# Patient Record
Sex: MALE | Race: WHITE | NOT HISPANIC OR LATINO | URBAN - METROPOLITAN AREA
[De-identification: names, ages, dates, MRNs, and addresses within clinical notes are randomized per-mention and may not be internally consistent; named-entity substitution may affect disease eponyms.]

---

## 2017-09-07 ENCOUNTER — EMERGENCY (EMERGENCY)
Facility: HOSPITAL | Age: 57
LOS: 1 days | Discharge: HOME | End: 2017-09-07
Admitting: EMERGENCY MEDICINE
Payer: COMMERCIAL

## 2017-09-07 VITALS
SYSTOLIC BLOOD PRESSURE: 150 MMHG | HEART RATE: 77 BPM | DIASTOLIC BLOOD PRESSURE: 91 MMHG | OXYGEN SATURATION: 97 % | TEMPERATURE: 98 F | RESPIRATION RATE: 14 BRPM

## 2017-09-07 VITALS
TEMPERATURE: 98 F | OXYGEN SATURATION: 98 % | HEART RATE: 81 BPM | RESPIRATION RATE: 17 BRPM | SYSTOLIC BLOOD PRESSURE: 138 MMHG | DIASTOLIC BLOOD PRESSURE: 82 MMHG

## 2017-09-07 DIAGNOSIS — R42 DIZZINESS AND GIDDINESS: ICD-10-CM

## 2017-09-07 DIAGNOSIS — R26.2 DIFFICULTY IN WALKING, NOT ELSEWHERE CLASSIFIED: ICD-10-CM

## 2017-09-07 DIAGNOSIS — R11.2 NAUSEA WITH VOMITING, UNSPECIFIED: ICD-10-CM

## 2017-09-07 LAB
ALBUMIN SERPL ELPH-MCNC: 3.7 G/DL — SIGNIFICANT CHANGE UP (ref 3.4–5)
ALP SERPL-CCNC: 94 U/L — SIGNIFICANT CHANGE UP (ref 40–120)
ALT FLD-CCNC: 43 U/L — HIGH (ref 12–42)
ANION GAP SERPL CALC-SCNC: 8 MMOL/L — LOW (ref 9–16)
AST SERPL-CCNC: 26 U/L — SIGNIFICANT CHANGE UP (ref 15–37)
BASOPHILS NFR BLD AUTO: 0.5 % — SIGNIFICANT CHANGE UP (ref 0–2)
BILIRUB SERPL-MCNC: 0.3 MG/DL — SIGNIFICANT CHANGE UP (ref 0.2–1.2)
BUN SERPL-MCNC: 24 MG/DL — HIGH (ref 7–23)
CALCIUM SERPL-MCNC: 8.6 MG/DL — SIGNIFICANT CHANGE UP (ref 8.5–10.5)
CHLORIDE SERPL-SCNC: 104 MMOL/L — SIGNIFICANT CHANGE UP (ref 96–108)
CO2 SERPL-SCNC: 28 MMOL/L — SIGNIFICANT CHANGE UP (ref 22–31)
CREAT SERPL-MCNC: 1.16 MG/DL — SIGNIFICANT CHANGE UP (ref 0.5–1.3)
EOSINOPHIL NFR BLD AUTO: 0.4 % — SIGNIFICANT CHANGE UP (ref 0–6)
GLUCOSE SERPL-MCNC: 142 MG/DL — HIGH (ref 70–99)
HCT VFR BLD CALC: 44.9 % — SIGNIFICANT CHANGE UP (ref 39–50)
HGB BLD-MCNC: 15.3 G/DL — SIGNIFICANT CHANGE UP (ref 13–17)
IMM GRANULOCYTES NFR BLD AUTO: 0.2 % — SIGNIFICANT CHANGE UP (ref 0–1.5)
LYMPHOCYTES # BLD AUTO: 10.1 % — LOW (ref 13–44)
MCHC RBC-ENTMCNC: 30.4 PG — SIGNIFICANT CHANGE UP (ref 27–34)
MCHC RBC-ENTMCNC: 34.1 G/DL — SIGNIFICANT CHANGE UP (ref 32–36)
MCV RBC AUTO: 89.3 FL — SIGNIFICANT CHANGE UP (ref 80–100)
MONOCYTES NFR BLD AUTO: 6.3 % — SIGNIFICANT CHANGE UP (ref 2–14)
NEUTROPHILS NFR BLD AUTO: 82.5 % — HIGH (ref 43–77)
PLATELET # BLD AUTO: 178 K/UL — SIGNIFICANT CHANGE UP (ref 150–400)
POTASSIUM SERPL-MCNC: 4.1 MMOL/L — SIGNIFICANT CHANGE UP (ref 3.5–5.3)
POTASSIUM SERPL-SCNC: 4.1 MMOL/L — SIGNIFICANT CHANGE UP (ref 3.5–5.3)
PROT SERPL-MCNC: 7.7 G/DL — SIGNIFICANT CHANGE UP (ref 6.4–8.2)
RBC # BLD: 5.03 M/UL — SIGNIFICANT CHANGE UP (ref 4.2–5.8)
RBC # FLD: 12.7 % — SIGNIFICANT CHANGE UP (ref 10.3–16.9)
SODIUM SERPL-SCNC: 140 MMOL/L — SIGNIFICANT CHANGE UP (ref 132–145)
TROPONIN I SERPL-MCNC: <0.017 NG/ML — LOW (ref 0.02–0.06)
WBC # BLD: 12.5 K/UL — HIGH (ref 3.8–10.5)
WBC # FLD AUTO: 12.5 K/UL — HIGH (ref 3.8–10.5)

## 2017-09-07 PROCEDURE — 99285 EMERGENCY DEPT VISIT HI MDM: CPT | Mod: 25

## 2017-09-07 PROCEDURE — 93010 ELECTROCARDIOGRAM REPORT: CPT

## 2017-09-07 PROCEDURE — 70450 CT HEAD/BRAIN W/O DYE: CPT | Mod: 26

## 2017-09-07 RX ORDER — SODIUM CHLORIDE 9 MG/ML
1000 INJECTION INTRAMUSCULAR; INTRAVENOUS; SUBCUTANEOUS ONCE
Qty: 0 | Refills: 0 | Status: COMPLETED | OUTPATIENT
Start: 2017-09-07 | End: 2017-09-07

## 2017-09-07 RX ORDER — MECLIZINE HCL 12.5 MG
25 TABLET ORAL ONCE
Qty: 0 | Refills: 0 | Status: COMPLETED | OUTPATIENT
Start: 2017-09-07 | End: 2017-09-07

## 2017-09-07 RX ORDER — MECLIZINE HCL 12.5 MG
1 TABLET ORAL
Qty: 15 | Refills: 0 | OUTPATIENT
Start: 2017-09-07 | End: 2017-09-12

## 2017-09-07 RX ORDER — ONDANSETRON 8 MG/1
4 TABLET, FILM COATED ORAL ONCE
Qty: 0 | Refills: 0 | Status: COMPLETED | OUTPATIENT
Start: 2017-09-07 | End: 2017-09-07

## 2017-09-07 RX ADMIN — SODIUM CHLORIDE 1000 MILLILITER(S): 9 INJECTION INTRAMUSCULAR; INTRAVENOUS; SUBCUTANEOUS at 01:27

## 2017-09-07 RX ADMIN — ONDANSETRON 4 MILLIGRAM(S): 8 TABLET, FILM COATED ORAL at 01:49

## 2017-09-07 RX ADMIN — Medication 25 MILLIGRAM(S): at 02:17

## 2017-09-07 NOTE — ED PROVIDER NOTE - OBJECTIVE STATEMENT
56 y/o M with no known sig PMHx presents c/o intermittent dizziness x 2 hours that began while he was walking around at work. Dizziness is described as an "off-balance" sensation and is associated with N/V. It is brought on by movements and resolves when he remains still. No hx of similar episodes in the past. No other associated sx's or complaints. He specifically denies having CP despite triage note.    Denies fever, chills, headache, vision changes, confusion, LOC, neck pain or stiffness, CP, SOB, palpitations, abdo pain, diarrhea, focal numbness or weakness, recent fall or injury

## 2017-09-07 NOTE — ED ADULT NURSE NOTE - CHPI ED SYMPTOMS NEG
no confusion/no vomiting/no blurred vision/no nausea/no fever/no change in level of consciousness/no numbness/no weakness

## 2017-09-07 NOTE — ED PROVIDER NOTE - MEDICAL DECISION MAKING DETAILS
CT Head negative. Pt reports feeling much better after being given meclizine in ED. A&Ox3. NAD. Sitting comfortably with no other complaints at this time. Will D/C with instructions to F/U with Neuro in 24-48 hours. Strict return precautions reviewed with pt in which pt verbalizes understanding and agrees to.

## 2017-09-07 NOTE — ED ADULT NURSE NOTE - OBJECTIVE STATEMENT
Pt presents to ED with c/o dizziness when moving head or changing positions.   Cleaned out ear this morning with q-tip

## 2020-09-22 NOTE — ED PROVIDER NOTE - CROS ED CONS ALL NEG
Problem: Mobility Impaired (Adult and Pediatric)  Goal: *Acute Goals and Plan of Care (Insert Text)  Description: FUNCTIONAL STATUS PRIOR TO ADMISSION: Patient was modified independent using a rollator vs no DME for functional mobility until about a week ago when he reports increased fatigue and weakness. Has been mostly bedbound for about a week. HOME SUPPORT PRIOR TO ADMISSION: The patient lived with daughter who provides assistance as needed. Physical Therapy Goals  Initiated 9/22/2020  1. Patient will move from supine to sit and sit to supine , scoot up and down, and roll side to side in bed with independence within 7 day(s). 2.  Patient will transfer from bed to chair and chair to bed with modified independence using the least restrictive device within 7 day(s). 3.  Patient will perform sit to stand with modified independence within 7 day(s). 4.  Patient will ambulate with modified independence for 100 feet with the least restrictive device within 7 day(s). Outcome: Progressing Towards Goal  PHYSICAL THERAPY EVALUATION  Patient: Christophe Gonzalez Sr. (79 y.o. male)  Date: 9/22/2020  Primary Diagnosis: Hyponatremia [E87.1]  Syncope [R55]        Precautions:  DNR    ASSESSMENT  Based on the objective data described below, the patient presents with generalized weakness, decreased activity endurance, and impaired standing dynamic balance all limiting patients functional mobility. Orthostatic BP assessed, patient with BP drop supine to sit but asymptomatic and remain stable with standing. BP returned to supine level post ambulation. He tolerated ambulating in room with use of RW. Requires support of AD for balance and weakness. Patient improved mobility since arrival to hospital but remains below baseline. Patient is safe to return home with daughters assistance and would recommend continued skilled therapy services, [facility] vs outpatient dependent on patients ability to access community services. Vitals:    09/22/20 1029 09/22/20 1032 09/22/20 1036 09/22/20 1041   BP: (!) 151/58 129/78 128/84 (!) 154/85   BP 1 Location: Left arm Left arm Left arm Left arm   BP Patient Position: Supine Sitting Standing Sitting, Post activity    Pulse: 63 70 73 66     Current Level of Function Impacting Discharge (mobility/balance): CGA-Min A    Functional Outcome Measure: The patient scored 60/100 on the Barthel Index outcome measure which is indicative of moderate-mild dependency for functional mobility/ADL. Other factors to consider for discharge: supportive daughter     Patient will benefit from skilled therapy intervention to address the above noted impairments. PLAN :  Recommendations and Planned Interventions: bed mobility training, transfer training, gait training, therapeutic exercises, patient and family training/education and therapeutic activities      Frequency/Duration: Patient will be followed by physical therapy:  4 times a week to address goals. Recommendation for discharge: (in order for the patient to meet his/her long term goals)  Physical therapy at least 2 days/week in the home  vs outpatient PT (likely [facility] as expect patient to have difficulty with tolerating transfers/travel to clinic)    This discharge recommendation:  Has been made in collaboration with the attending provider and/or case management    IF patient discharges home will need the following DME: patient owns DME required for discharge       SUBJECTIVE:   Patient stated I just feel weak. 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 Calcium and PTH monitored by Dr. Aldo Cheung, renal.  Levels normalized after surgery.  Prostate cancer ([address]     seeds, then XRT, then seed again. Dr. Owen Guerrero Stroke [facility]) 2002    TIA, Dr. Anna Giraldo, no residual effects as of 9/2018    Thyroid cancer [facility]) Jan 2015    Unspecified vitamin D deficiency      Past Surgical History:   Procedure Laterality Date   Yissel Young    COLONOSCOPY N/A 9/17/2018    COLONOSCOPY performed by Yunior Mcdonald MD at [facility] AMBULATORY OR    HX APPENDECTOMY      HX CHOLECYSTECTOMY      HX HEENT      tonsillectomy    HX OTHER SURGICAL      vasectomy    HX PARATHYROIDECTOMY  01/14/2015    right superior parathyroid gland removed and left superior parathyroid gland removed    HX PARTIAL THYROIDECTOMY  1/14/15    right lobectomy    HX UROLOGICAL      Seeds for prostate cancer , 4 dialations     HX UROLOGICAL  1/14/15    BLADDER NECK INCISION, Cold knife incision of bladder neck contracture, cystoscopy       Personal factors and/or comorbidities impacting plan of care:     Home Situation  Home Environment: Private residence  One/Two Story Residence: One story  Living Alone: No(daughter lives with him)  Support Systems: Child(handy)  Patient Expects to be Discharged to[de-identified] Private residence  Current DME Used/Available at Home: [employer]    EXAMINATION/PRESENTATION/DECISION MAKING:   Critical Behavior:  Neurologic State: Alert  Orientation Level: Oriented X4  Cognition: Appropriate safety awareness, Follows commands     Hearing:   Auditory  Auditory Impairment: Hard of hearing, bilateral  Range Of Motion:  AROM: Within functional limits                       Strength:    Strength: Generally decreased, functional                    Tone & Sensation:   Tone: Normal              Sensation: Impaired(RUE numbness; present PTA due to stenosis)        Functional Mobility:  Bed Mobility:  Rolling: Supervision; Additional time  Supine to Sit: Minimum assistance; Additional time     Scooting: Contact guard assistance; Additional time  Transfers:  Sit to Stand: Minimum assistance  Stand to Sit: Contact guard assistance     Balance:   Sitting: Intact  Standing: Impaired  Standing - Static: Good  Standing - Dynamic : Fair  Ambulation/Gait Training:  Distance (ft): 45 Feet (ft)  Assistive Device: Walker, rolling;Gait belt  Ambulation - Level of Assistance: Contact guard assistance        Gait Abnormalities: Decreased step clearance(mild forward flexed posture)        Base of Support: Widened     Speed/Kaila: Slow  Step Length: Right shortened;Left shortened(mildly)                  Slightly decreased kaila. Demonstrated safety with use of RW. Distance limited secondary to weakness. Stairs:   Not performed; patient reports no stairs at baseline      Functional Measure:  Barthel Index:    Bathin  Bladder: 0  Bowels: 10  Groomin  Dressin  Feeding: 10  Mobility: 10  Stairs: 5  Toilet Use: 5  Transfer (Bed to Chair and Back): 10  Total: 60/100     The Barthel ADL Index: Guidelines  1. The index should be used as a record of what a patient does, not as a record of what a patient could do. 2. The main aim is to establish degree of independence from any help, physical or verbal, however minor and for whatever reason. 3. The need for supervision renders the patient not independent. 4. A patient's performance should be established using the best available evidence. Asking the patient, friends/relatives and nurses are the usual sources, but direct observation and common sense are also important. However direct testing is not needed. 5. Usually the patient's performance over the preceding 24-48 hours is important, but occasionally longer periods will be relevant.   6. Middle categories imply that the patient supplies over 50 per cent of the effort. 7. Use of aids to be independent is allowed. [place] Barthel, D.W. ([id number]). Functional evaluation: the Barthel Index. [facility] (14)2. LETY Jean, Thom Shanks.Franko., Avani, [address] (1999). Measuring the change indisability after inpatient rehabilitation; comparison of the responsiveness of the Barthel Index and Functional [place] Measure. Journal of Neurology, Neurosurgery, and Psychiatry, 66(4), 885-470. Belen Mayer, N.J.A, AMY Galarza, & Ranae Lanes, M.A. (2004.) Assessment of post-stroke quality of life in cost-effectiveness studies: The usefulness of the Barthel Index and the EuroQoL-5D. Quality of Life Research, 15, 415-92        Physical Therapy Evaluation Charge Determination   History Examination Presentation Decision-Making   LOW Complexity : Zero comorbidities / personal factors that will impact the outcome / POC MEDIUM Complexity : 3 Standardized tests and measures addressing body structure, function, activity limitation and / or participation in recreation  LOW Complexity : Stable, uncomplicated  Other outcome measures Barthel Index  MEDIUM      Based on the above components, the patient evaluation is determined to be of the following complexity level: LOW     Pain Rating:  No c/o pain     Activity Tolerance:   Fair  Please refer to the flowsheet for vital signs taken during this treatment. After treatment patient left in no apparent distress:   Sitting in chair, Call bell within reach and Caregiver / family present    COMMUNICATION/EDUCATION:   The patients plan of care was discussed with: Registered nurse and Case management. Fall prevention education was provided and the patient/caregiver indicated understanding., Patient/family have participated as able in goal setting and plan of care. and Patient/family agree to work toward stated goals and plan of care.     Thank you for this referral.  Bronwyn Nelson Jen Mcneal, PT, DPT   Time Calculation: 17 mins negative...

## 2022-05-19 PROBLEM — Z00.00 ENCOUNTER FOR PREVENTIVE HEALTH EXAMINATION: Status: ACTIVE | Noted: 2022-05-19

## 2022-05-23 ENCOUNTER — RESULT CHARGE (OUTPATIENT)
Age: 62
End: 2022-05-23

## 2022-05-23 ENCOUNTER — APPOINTMENT (OUTPATIENT)
Dept: CARDIOLOGY | Facility: CLINIC | Age: 62
End: 2022-05-23
Payer: COMMERCIAL

## 2022-05-23 ENCOUNTER — TRANSCRIPTION ENCOUNTER (OUTPATIENT)
Age: 62
End: 2022-05-23

## 2022-05-23 VITALS
HEIGHT: 60 IN | DIASTOLIC BLOOD PRESSURE: 80 MMHG | WEIGHT: 5.44 LBS | HEART RATE: 91 BPM | TEMPERATURE: 97.7 F | BODY MASS INDEX: 1.07 KG/M2 | SYSTOLIC BLOOD PRESSURE: 130 MMHG

## 2022-05-23 VITALS — WEIGHT: 160 LBS | BODY MASS INDEX: 31.25 KG/M2

## 2022-05-23 VITALS — HEIGHT: 67 IN | BODY MASS INDEX: 25.06 KG/M2

## 2022-05-23 DIAGNOSIS — Z87.19 PERSONAL HISTORY OF OTHER DISEASES OF THE DIGESTIVE SYSTEM: ICD-10-CM

## 2022-05-23 DIAGNOSIS — R26.89 OTHER ABNORMALITIES OF GAIT AND MOBILITY: ICD-10-CM

## 2022-05-23 DIAGNOSIS — Z87.09 PERSONAL HISTORY OF OTHER DISEASES OF THE RESPIRATORY SYSTEM: ICD-10-CM

## 2022-05-23 DIAGNOSIS — Z82.49 FAMILY HISTORY OF ISCHEMIC HEART DISEASE AND OTHER DISEASES OF THE CIRCULATORY SYSTEM: ICD-10-CM

## 2022-05-23 DIAGNOSIS — U07.1 COVID-19: ICD-10-CM

## 2022-05-23 DIAGNOSIS — Z78.9 OTHER SPECIFIED HEALTH STATUS: ICD-10-CM

## 2022-05-23 DIAGNOSIS — Z87.11 PERSONAL HISTORY OF PEPTIC ULCER DISEASE: ICD-10-CM

## 2022-05-23 PROCEDURE — 99204 OFFICE O/P NEW MOD 45 MIN: CPT

## 2022-05-23 PROCEDURE — 93000 ELECTROCARDIOGRAM COMPLETE: CPT

## 2022-05-23 NOTE — ASSESSMENT
[FreeTextEntry1] : Episodes of unstable gait / chest tightness.\par Family history of aortic aneurysm.\par Episodes of elevated BP.\par \par Recommend:\par - 2D ECHO.\par - will obtain US of abdominal aorta and carotid duplex.\par - will request blood work from PCP.\par \par F/u in 1 month.\par Will need a stress test.\par \par Hiren Mayer MD\par \par \par

## 2022-05-23 NOTE — REVIEW OF SYSTEMS
[Chest Discomfort] : chest discomfort [Palpitations] : palpitations [Negative] : Heme/Lymph [Dizziness] : dizziness [de-identified] : unstable gait

## 2022-05-23 NOTE — PHYSICAL EXAM
[Well Developed] : well developed [Well Nourished] : well nourished [No Acute Distress] : no acute distress [Normal Conjunctiva] : normal conjunctiva [Normal Venous Pressure] : normal venous pressure [Normal S1, S2] : normal S1, S2 [No Murmur] : no murmur [Clear Lung Fields] : clear lung fields [Soft] : abdomen soft [Non Tender] : non-tender [Normal Gait] : normal gait [Venous varicosities] : venous varicosities [No Rash] : no rash [Moves all extremities] : moves all extremities [Alert and Oriented] : alert and oriented [No Edema] : no edema [Normal Speech] : normal speech

## 2022-06-13 ENCOUNTER — APPOINTMENT (OUTPATIENT)
Dept: CARDIOLOGY | Facility: CLINIC | Age: 62
End: 2022-06-13
Payer: COMMERCIAL

## 2022-06-13 PROCEDURE — 93306 TTE W/DOPPLER COMPLETE: CPT

## 2022-06-13 PROCEDURE — 93978 VASCULAR STUDY: CPT

## 2022-07-08 ENCOUNTER — APPOINTMENT (OUTPATIENT)
Dept: CARDIOLOGY | Facility: CLINIC | Age: 62
End: 2022-07-08

## 2022-07-22 ENCOUNTER — APPOINTMENT (OUTPATIENT)
Dept: CARDIOLOGY | Facility: CLINIC | Age: 62
End: 2022-07-22

## 2022-08-10 ENCOUNTER — APPOINTMENT (OUTPATIENT)
Dept: CARDIOLOGY | Facility: CLINIC | Age: 62
End: 2022-08-10

## 2022-08-11 ENCOUNTER — APPOINTMENT (OUTPATIENT)
Dept: CARDIOLOGY | Facility: CLINIC | Age: 62
End: 2022-08-11

## 2022-08-11 PROCEDURE — 93880 EXTRACRANIAL BILAT STUDY: CPT

## 2022-09-09 ENCOUNTER — RESULT CHARGE (OUTPATIENT)
Age: 62
End: 2022-09-09

## 2022-09-09 ENCOUNTER — APPOINTMENT (OUTPATIENT)
Dept: CARDIOLOGY | Facility: CLINIC | Age: 62
End: 2022-09-09

## 2022-09-09 VITALS
HEART RATE: 74 BPM | HEIGHT: 67 IN | DIASTOLIC BLOOD PRESSURE: 66 MMHG | BODY MASS INDEX: 25.43 KG/M2 | WEIGHT: 162 LBS | SYSTOLIC BLOOD PRESSURE: 110 MMHG | TEMPERATURE: 97.5 F

## 2022-09-09 DIAGNOSIS — Z87.898 PERSONAL HISTORY OF OTHER SPECIFIED CONDITIONS: ICD-10-CM

## 2022-09-09 DIAGNOSIS — R07.89 OTHER CHEST PAIN: ICD-10-CM

## 2022-09-09 PROCEDURE — 99213 OFFICE O/P EST LOW 20 MIN: CPT | Mod: 25

## 2022-09-09 PROCEDURE — 93000 ELECTROCARDIOGRAM COMPLETE: CPT

## 2022-09-09 NOTE — CARDIOLOGY SUMMARY
[de-identified] : 09/09/2022: NSR, normal ECG [de-identified] : 06/13/22:\par LVEF 60%, normal diastolic function\par Atrial septal aneurysm. [de-identified] : Carotid duplex:\par Mild bilateral plaque.\par \par Abdominal aorta duplex:\par No AAA.

## 2022-09-09 NOTE — ASSESSMENT
[FreeTextEntry1] : Occasional BP elevation.\par Hyperlipidemia.\par Atrial septal aneurysm.\par \par Recommend:\par ASA 81 mg daily.\par Low-fat diet, discussed. Will need repeat BW.\par \par F/u in 1 year.\par \par Hiren Mayer MD\par \par \par

## 2022-09-09 NOTE — PHYSICAL EXAM
[Well Developed] : well developed [Well Nourished] : well nourished [No Acute Distress] : no acute distress [Normal Conjunctiva] : normal conjunctiva [Normal Venous Pressure] : normal venous pressure [Normal S1, S2] : normal S1, S2 [No Murmur] : no murmur [Clear Lung Fields] : clear lung fields [Soft] : abdomen soft [Non Tender] : non-tender [Normal Gait] : normal gait [No Edema] : no edema [Venous varicosities] : venous varicosities [No Rash] : no rash [Moves all extremities] : moves all extremities [Normal Speech] : normal speech [Alert and Oriented] : alert and oriented

## 2022-09-09 NOTE — HISTORY OF PRESENT ILLNESS
[FreeTextEntry1] : 62 y.o. male presents with complains of episodes of unstable gait without dizziness and sometimes associated with nausea, tension headache, neck tightness and sometimes chest discomfort. The most recent episode  last week.  Reports fluctuations in BP ( highest BP is 160), takes a medicine for high BP PRN, doesn't remember the name or dose. He had COVID twice this year. For 3 months after COVID, patient had palpitations, last episode was several months ago. Denies SOB. Reports family history of aortic aneurysm. Patient is normotensive at the time of the visit. Last blood work done a month ago with his PCP Dr. Sequeira.\par \par .

## 2022-09-17 ENCOUNTER — NON-APPOINTMENT (OUTPATIENT)
Age: 62
End: 2022-09-17

## 2023-09-08 ENCOUNTER — APPOINTMENT (OUTPATIENT)
Dept: CARDIOLOGY | Facility: CLINIC | Age: 63
End: 2023-09-08
Payer: COMMERCIAL

## 2023-09-08 VITALS
SYSTOLIC BLOOD PRESSURE: 130 MMHG | WEIGHT: 159 LBS | HEIGHT: 67 IN | BODY MASS INDEX: 24.96 KG/M2 | DIASTOLIC BLOOD PRESSURE: 70 MMHG | HEART RATE: 79 BPM

## 2023-09-08 DIAGNOSIS — I10 ESSENTIAL (PRIMARY) HYPERTENSION: ICD-10-CM

## 2023-09-08 DIAGNOSIS — E78.5 HYPERLIPIDEMIA, UNSPECIFIED: ICD-10-CM

## 2023-09-08 DIAGNOSIS — I25.3 ANEURYSM OF HEART: ICD-10-CM

## 2023-09-08 PROCEDURE — 99213 OFFICE O/P EST LOW 20 MIN: CPT | Mod: 25

## 2023-09-08 PROCEDURE — 93000 ELECTROCARDIOGRAM COMPLETE: CPT

## 2023-09-08 NOTE — HISTORY OF PRESENT ILLNESS
[FreeTextEntry1] : 62 y.o. male with PMHx of atrial septal aneurysm, HLD  Pt presents for a follow up visit. He had MORAES for a few months earlier this year, now improved. He still gets episodes of dizziness, feeling off balance. He has been having blurry vision and floaters, is being followed by an ophthalmologist, states he was told it is migraines, Denies chest pain or palpitations. BP at home 130s/70s. Stopped taking ASA a few months ago.    GFR 72 TSH 0.9.

## 2023-09-08 NOTE — CARDIOLOGY SUMMARY
[de-identified] : 9/8/23: SR, normal ECG [de-identified] : 06/13/22:\par  LVEF 60%, normal diastolic function\par  Atrial septal aneurysm. [de-identified] : Carotid duplex:\par  Mild bilateral plaque.\par  \par  Abdominal aorta duplex:\par  No AAA.

## 2023-09-08 NOTE — ASSESSMENT
[FreeTextEntry1] : 63 YOM Hyperlipidemia Atrial septal aneurysm. Period of increased MORAES after trip to [place] after Covid, now resolved.  Plan: Resume ASA 81 mg daily. Would start a statin (intermediate risk for CV events). Low-fat diet, discussed.  F/u in 1 year.  Hiren Mayer MD

## 2024-09-20 ENCOUNTER — APPOINTMENT (OUTPATIENT)
Dept: CARDIOLOGY | Facility: CLINIC | Age: 64
End: 2024-09-20

## 2025-03-21 ENCOUNTER — NON-APPOINTMENT (OUTPATIENT)
Age: 65
End: 2025-03-21

## 2025-08-04 ENCOUNTER — NON-APPOINTMENT (OUTPATIENT)
Age: 65
End: 2025-08-04

## 2025-08-05 ENCOUNTER — APPOINTMENT (OUTPATIENT)
Dept: CARDIOLOGY | Facility: CLINIC | Age: 65
End: 2025-08-05
Payer: COMMERCIAL

## 2025-08-05 VITALS
HEART RATE: 87 BPM | WEIGHT: 161 LBS | SYSTOLIC BLOOD PRESSURE: 128 MMHG | BODY MASS INDEX: 25.27 KG/M2 | HEIGHT: 67 IN | DIASTOLIC BLOOD PRESSURE: 80 MMHG

## 2025-08-05 DIAGNOSIS — I25.3 ANEURYSM OF HEART: ICD-10-CM

## 2025-08-05 DIAGNOSIS — R00.2 PALPITATIONS: ICD-10-CM

## 2025-08-05 DIAGNOSIS — R06.02 SHORTNESS OF BREATH: ICD-10-CM

## 2025-08-05 DIAGNOSIS — I10 ESSENTIAL (PRIMARY) HYPERTENSION: ICD-10-CM

## 2025-08-05 DIAGNOSIS — R42 DIZZINESS AND GIDDINESS: ICD-10-CM

## 2025-08-05 DIAGNOSIS — E78.5 HYPERLIPIDEMIA, UNSPECIFIED: ICD-10-CM

## 2025-08-05 PROCEDURE — 93000 ELECTROCARDIOGRAM COMPLETE: CPT

## 2025-08-05 PROCEDURE — 99214 OFFICE O/P EST MOD 30 MIN: CPT | Mod: 25
